# Patient Record
Sex: FEMALE | URBAN - METROPOLITAN AREA
[De-identification: names, ages, dates, MRNs, and addresses within clinical notes are randomized per-mention and may not be internally consistent; named-entity substitution may affect disease eponyms.]

---

## 2019-01-23 ENCOUNTER — APPOINTMENT (RX ONLY)
Dept: URBAN - METROPOLITAN AREA CLINIC 26 | Facility: CLINIC | Age: 59
Setting detail: DERMATOLOGY
End: 2019-01-23

## 2019-01-23 DIAGNOSIS — L40.0 PSORIASIS VULGARIS: ICD-10-CM

## 2019-01-23 PROBLEM — L85.3 XEROSIS CUTIS: Status: ACTIVE | Noted: 2019-01-23

## 2019-01-23 PROBLEM — L20.84 INTRINSIC (ALLERGIC) ECZEMA: Status: ACTIVE | Noted: 2019-01-23

## 2019-01-23 PROCEDURE — 99202 OFFICE O/P NEW SF 15 MIN: CPT

## 2019-01-23 PROCEDURE — ? TREATMENT REGIMEN

## 2019-01-23 PROCEDURE — ? COUNSELING

## 2019-01-23 PROCEDURE — ? PRESCRIPTION

## 2019-01-23 RX ORDER — PIMECROLIMUS 10 MG/G
CREAM TOPICAL
Qty: 1 | Refills: 3 | Status: ERX | COMMUNITY
Start: 2019-01-23

## 2019-01-23 RX ORDER — CLOBETASOL PROPIONATE 0.5 MG/G
1 CREAM TOPICAL
Qty: 1 | Refills: 3 | Status: ERX | COMMUNITY
Start: 2019-01-23

## 2019-01-23 RX ORDER — DESONIDE 0.5 MG/G
CREAM TOPICAL
Qty: 1 | Refills: 1 | Status: ERX | COMMUNITY
Start: 2019-01-23

## 2019-01-23 RX ORDER — TACROLIMUS 1 MG/G
OINTMENT TOPICAL
Qty: 1 | Refills: 3 | Status: ERX | COMMUNITY
Start: 2019-01-23

## 2019-01-23 RX ADMIN — DESONIDE 1: 0.5 CREAM TOPICAL at 00:00

## 2019-01-23 RX ADMIN — PIMECROLIMUS: 10 CREAM TOPICAL at 00:00

## 2019-01-23 RX ADMIN — CLOBETASOL PROPIONATE 1: 0.5 CREAM TOPICAL at 00:00

## 2019-01-23 RX ADMIN — TACROLIMUS: 1 OINTMENT TOPICAL at 00:00

## 2019-01-23 ASSESSMENT — LOCATION DETAILED DESCRIPTION DERM
LOCATION DETAILED: RIGHT ELBOW
LOCATION DETAILED: RIGHT MEDIAL SUPERIOR EYELID
LOCATION DETAILED: LEFT ELBOW
LOCATION DETAILED: LEFT LATERAL SUPERIOR EYELID

## 2019-01-23 ASSESSMENT — LOCATION SIMPLE DESCRIPTION DERM
LOCATION SIMPLE: RIGHT SUPERIOR EYELID
LOCATION SIMPLE: LEFT SUPERIOR EYELID
LOCATION SIMPLE: LEFT ELBOW
LOCATION SIMPLE: RIGHT ELBOW

## 2019-01-23 ASSESSMENT — LOCATION ZONE DERM
LOCATION ZONE: ARM
LOCATION ZONE: EYELID

## 2019-01-23 NOTE — PROCEDURE: TREATMENT REGIMEN
Action 4: Continue
Start Regimen: For face: use desonate cream bid x 5-7 days then switch to Elidel bid until clear OR Protopic 0.1% qd until clear (pt requests both prescriptions because she likes to alternate between cream and ointment depending on the time of day) \\n\\nFor elbows: clobetasol cream bid until clear (max of 4 weeks) then may use 2x per week prn for maintenance
Detail Level: Zone
Otc Regimen: Cerave psoriasis cream

## 2019-03-25 ENCOUNTER — APPOINTMENT (RX ONLY)
Dept: URBAN - METROPOLITAN AREA CLINIC 26 | Facility: CLINIC | Age: 59
Setting detail: DERMATOLOGY
End: 2019-03-25

## 2019-03-25 DIAGNOSIS — L81.4 OTHER MELANIN HYPERPIGMENTATION: ICD-10-CM

## 2019-03-25 DIAGNOSIS — L91.8 OTHER HYPERTROPHIC DISORDERS OF THE SKIN: ICD-10-CM

## 2019-03-25 DIAGNOSIS — L82.1 OTHER SEBORRHEIC KERATOSIS: ICD-10-CM

## 2019-03-25 DIAGNOSIS — L40.0 PSORIASIS VULGARIS: ICD-10-CM | Status: WELL CONTROLLED

## 2019-03-25 DIAGNOSIS — L82.0 INFLAMED SEBORRHEIC KERATOSIS: ICD-10-CM

## 2019-03-25 DIAGNOSIS — D22 MELANOCYTIC NEVI: ICD-10-CM

## 2019-03-25 DIAGNOSIS — D18.0 HEMANGIOMA: ICD-10-CM

## 2019-03-25 PROBLEM — D22.9 MELANOCYTIC NEVI, UNSPECIFIED: Status: ACTIVE | Noted: 2019-03-25

## 2019-03-25 PROBLEM — D18.01 HEMANGIOMA OF SKIN AND SUBCUTANEOUS TISSUE: Status: ACTIVE | Noted: 2019-03-25

## 2019-03-25 PROCEDURE — ? DEFER

## 2019-03-25 PROCEDURE — 17110 DESTRUCTION B9 LES UP TO 14: CPT

## 2019-03-25 PROCEDURE — 99214 OFFICE O/P EST MOD 30 MIN: CPT | Mod: 25

## 2019-03-25 PROCEDURE — ? COUNSELING

## 2019-03-25 PROCEDURE — ? LIQUID NITROGEN

## 2019-03-25 PROCEDURE — ? ADDITIONAL NOTES

## 2019-03-25 PROCEDURE — ? TREATMENT REGIMEN

## 2019-03-25 ASSESSMENT — LOCATION DETAILED DESCRIPTION DERM
LOCATION DETAILED: LEFT AXILLARY VAULT
LOCATION DETAILED: LEFT ELBOW
LOCATION DETAILED: LEFT ANTERIOR DISTAL THIGH
LOCATION DETAILED: LEFT LATERAL SUPERIOR EYELID
LOCATION DETAILED: LEFT RIB CAGE
LOCATION DETAILED: RIGHT MEDIAL SUPERIOR EYELID
LOCATION DETAILED: RIGHT ELBOW
LOCATION DETAILED: RIGHT DISTAL POSTERIOR UPPER ARM

## 2019-03-25 ASSESSMENT — LOCATION SIMPLE DESCRIPTION DERM
LOCATION SIMPLE: RIGHT ELBOW
LOCATION SIMPLE: ABDOMEN
LOCATION SIMPLE: LEFT AXILLARY VAULT
LOCATION SIMPLE: RIGHT SUPERIOR EYELID
LOCATION SIMPLE: RIGHT POSTERIOR UPPER ARM
LOCATION SIMPLE: LEFT THIGH
LOCATION SIMPLE: LEFT SUPERIOR EYELID
LOCATION SIMPLE: LEFT ELBOW

## 2019-03-25 ASSESSMENT — LOCATION ZONE DERM
LOCATION ZONE: EYELID
LOCATION ZONE: LEG
LOCATION ZONE: TRUNK
LOCATION ZONE: AXILLAE
LOCATION ZONE: ARM

## 2019-03-25 ASSESSMENT — PGA PSORIASIS: PGA PSORIASIS: CLEAR (NO ELEVATION, SCALE, OR ERYTHEMA, EXCEPT FOR RESIDUAL DISCOLORATION)

## 2019-03-25 NOTE — PROCEDURE: DEFER
Introduction Text (Please End With A Colon): The following procedure was deferred:
Detail Level: Detailed
Procedure To Be Performed At Next Visit: Liquid nitrogen

## 2019-03-25 NOTE — PROCEDURE: TREATMENT REGIMEN
Start Regimen: For face: use desonate cream bid x 5-7 days then switch to Elidel bid until clear OR Protopic 0.1% qd until clear (pt requests both prescriptions because she likes to alternate between cream and ointment depending on the time of day) \\n\\nFor elbows: clobetasol cream bid until clear (max of 4 weeks) then may use 2x per week prn for maintenance

## 2019-03-25 NOTE — PROCEDURE: TREATMENT REGIMEN
Plan: desonide 0.05 % topical cream  Sig: Apply thin layer to eyelids bid x 5-7days for major flares\\n clobetasol 0.05 % topical cream  Sig: apply to affected areas of elbows twice daily up to 4weeks then 2 x per week prn\\n Elidel 1 % topical cream  Sig: Apply thin layer of cream bid  to eyelids as needed when mild. Use this or tacrolimus but not both\\n tacrolimus 0.1 % topical ointment  Sig: Apply thin layer once daily to rash on eyelids when mild. Use this or elidel but not both at the same time Other Instructions: desonide 0.05 % topical cream  Sig: Apply thin layer to eyelids bid x 5-7days for major flares\\n clobetasol 0.05 % topical cream  Sig: apply to affected areas of elbows twice daily up to 4weeks then 2 x per week prn\\n Elidel 1 % topical cream  Sig: Apply thin layer of cream bid  to eyelids as needed when mild. Use this or tacrolimus but not both\\n tacrolimus 0.1 % topical ointment  Sig: Apply thin layer once daily to rash on eyelids when mild. Use this or elidel but not both at the same time

## 2019-03-25 NOTE — HPI: EVALUATION OF SKIN LESION(S)
What Type Of Note Output Would You Prefer (Optional)?: Bullet Format
Hpi Title: Evaluation of Skin Lesions
How Severe Are Your Spot(S)?: moderate
Have Your Spot(S) Been Treated In The Past?: has not been treated
Family Member: Sister

## 2019-03-25 NOTE — PROCEDURE: LIQUID NITROGEN
Medical Necessity Information: It is in your best interest to select a reason for this procedure from the list below. All of these items fulfill various CMS LCD requirements except the new and changing color options.
Detail Level: Detailed
Consent: The patient's consent was obtained including but not limited to risks of crusting, scabbing, blistering, scarring, darker or lighter pigmentary change, recurrence, incomplete removal and infection.
Add 52 Modifier (Optional): no
Number Of Freeze-Thaw Cycles: 2 freeze-thaw cycles
Medical Necessity Clause: This procedure was medically necessary because the lesions that were treated were:
Duration Of Freeze Thaw-Cycle (Seconds): 15
Post-Care Instructions: I reviewed with the patient in detail post-care instructions. Patient is to wear sunprotection, and avoid picking at any of the treated lesions. Pt may apply Vaseline to crusted or scabbing areas.

## 2020-02-18 ENCOUNTER — APPOINTMENT (RX ONLY)
Dept: URBAN - METROPOLITAN AREA CLINIC 26 | Facility: CLINIC | Age: 60
Setting detail: DERMATOLOGY
End: 2020-02-18

## 2020-02-18 DIAGNOSIS — L40.0 PSORIASIS VULGARIS: ICD-10-CM

## 2020-02-18 DIAGNOSIS — L82.0 INFLAMED SEBORRHEIC KERATOSIS: ICD-10-CM

## 2020-02-18 DIAGNOSIS — L91.8 OTHER HYPERTROPHIC DISORDERS OF THE SKIN: ICD-10-CM

## 2020-02-18 PROCEDURE — ? TREATMENT REGIMEN

## 2020-02-18 PROCEDURE — ? ADDITIONAL NOTES

## 2020-02-18 PROCEDURE — ? COUNSELING

## 2020-02-18 PROCEDURE — 99213 OFFICE O/P EST LOW 20 MIN: CPT | Mod: 25

## 2020-02-18 PROCEDURE — ? PRESCRIPTION

## 2020-02-18 PROCEDURE — 17111 DESTRUCTION B9 LESIONS 15/>: CPT

## 2020-02-18 PROCEDURE — ? LIQUID NITROGEN

## 2020-02-18 RX ORDER — TACROLIMUS 1 MG/G
OINTMENT TOPICAL
Qty: 1 | Refills: 3 | Status: ERX | COMMUNITY
Start: 2020-02-18

## 2020-02-18 RX ADMIN — TACROLIMUS: 1 OINTMENT TOPICAL at 00:00

## 2020-02-18 ASSESSMENT — LOCATION DETAILED DESCRIPTION DERM
LOCATION DETAILED: 2ND WEB SPACE LEFT HAND
LOCATION DETAILED: LEFT ELBOW
LOCATION DETAILED: LEFT PROXIMAL POSTERIOR THIGH
LOCATION DETAILED: LEFT CLAVICULAR NECK
LOCATION DETAILED: RIGHT MEDIAL SUPERIOR EYELID
LOCATION DETAILED: RIGHT ELBOW
LOCATION DETAILED: LEFT PROXIMAL PRETIBIAL REGION
LOCATION DETAILED: LEFT RADIAL DORSAL HAND
LOCATION DETAILED: RIGHT LATERAL DISTAL PRETIBIAL REGION
LOCATION DETAILED: RIGHT CLAVICULAR NECK
LOCATION DETAILED: RIGHT PROXIMAL PRETIBIAL REGION
LOCATION DETAILED: RIGHT RADIAL DORSAL HAND
LOCATION DETAILED: LEFT LATERAL SUPERIOR EYELID
LOCATION DETAILED: LEFT DORSAL INDEX FINGER METACARPOPHALANGEAL JOINT
LOCATION DETAILED: RIGHT DISTAL PRETIBIAL REGION
LOCATION DETAILED: RIGHT ULNAR DORSAL HAND
LOCATION DETAILED: RIGHT MEDIAL UPPER BACK

## 2020-02-18 ASSESSMENT — LOCATION ZONE DERM
LOCATION ZONE: TRUNK
LOCATION ZONE: NECK
LOCATION ZONE: ARM
LOCATION ZONE: LEG
LOCATION ZONE: HAND
LOCATION ZONE: EYELID

## 2020-02-18 ASSESSMENT — LOCATION SIMPLE DESCRIPTION DERM
LOCATION SIMPLE: LEFT POSTERIOR THIGH
LOCATION SIMPLE: LEFT PRETIBIAL REGION
LOCATION SIMPLE: RIGHT SUPERIOR EYELID
LOCATION SIMPLE: LEFT ANTERIOR NECK
LOCATION SIMPLE: LEFT ELBOW
LOCATION SIMPLE: RIGHT PRETIBIAL REGION
LOCATION SIMPLE: RIGHT ANTERIOR NECK
LOCATION SIMPLE: RIGHT ELBOW
LOCATION SIMPLE: RIGHT HAND
LOCATION SIMPLE: RIGHT UPPER BACK
LOCATION SIMPLE: LEFT SUPERIOR EYELID
LOCATION SIMPLE: LEFT HAND

## 2020-02-18 NOTE — PROCEDURE: LIQUID NITROGEN
Number Of Freeze-Thaw Cycles: 2 freeze-thaw cycles
Medical Necessity Clause: This procedure was medically necessary because the lesions that were treated were:
Render Note In Bullet Format When Appropriate: No
Post-Care Instructions: I reviewed with the patient in detail post-care instructions. Patient is to wear sunprotection, and avoid picking at any of the treated lesions. Pt may apply Vaseline to crusted or scabbing areas.
Duration Of Freeze Thaw-Cycle (Seconds): 15
Detail Level: Detailed
Consent: The patient's consent was obtained including but not limited to risks of crusting, scabbing, blistering, scarring, darker or lighter pigmentary change, recurrence, incomplete removal and infection.
Medical Necessity Information: It is in your best interest to select a reason for this procedure from the list below. All of these items fulfill various CMS LCD requirements except the new and changing color options.

## 2020-02-18 NOTE — PROCEDURE: TREATMENT REGIMEN
Continue Regimen: For face: use desonate cream bid x 5-7 days then switch to Elidel bid until clear OR Protopic 0.1% qd until clear (pt requests both prescriptions because she likes to alternate between cream and ointment depending on the time of day) \\n\\nFor elbows: clobetasol cream bid until clear (max of 4 weeks) then may use 2x per week prn for maintenance
Detail Level: Zone
Action 1: Continue
Otc Regimen: Bare40 with salicylic acid for elbows

## 2022-04-21 ENCOUNTER — APPOINTMENT (RX ONLY)
Dept: URBAN - METROPOLITAN AREA CLINIC 26 | Facility: CLINIC | Age: 62
Setting detail: DERMATOLOGY
End: 2022-04-21

## 2022-04-21 DIAGNOSIS — F42.4 EXCORIATION (SKIN-PICKING) DISORDER: ICD-10-CM

## 2022-04-21 DIAGNOSIS — L40.0 PSORIASIS VULGARIS: ICD-10-CM

## 2022-04-21 PROBLEM — S40.912A UNSPECIFIED SUPERFICIAL INJURY OF LEFT SHOULDER, INITIAL ENCOUNTER: Status: ACTIVE | Noted: 2022-04-21

## 2022-04-21 PROCEDURE — ? ADDITIONAL NOTES

## 2022-04-21 PROCEDURE — ? OTHER

## 2022-04-21 PROCEDURE — ? COUNSELING

## 2022-04-21 PROCEDURE — ? PHOTO-DOCUMENTATION

## 2022-04-21 PROCEDURE — ? PRESCRIPTION

## 2022-04-21 PROCEDURE — ? PRESCRIPTION MEDICATION MANAGEMENT

## 2022-04-21 PROCEDURE — 99213 OFFICE O/P EST LOW 20 MIN: CPT

## 2022-04-21 RX ORDER — MUPIROCIN 20 MG/G
OINTMENT TOPICAL
Qty: 22 | Refills: 1 | Status: ERX | COMMUNITY
Start: 2022-04-21

## 2022-04-21 RX ADMIN — MUPIROCIN 1: 20 OINTMENT TOPICAL at 00:00

## 2022-04-21 ASSESSMENT — LOCATION DETAILED DESCRIPTION DERM
LOCATION DETAILED: RIGHT MEDIAL SUPERIOR EYELID
LOCATION DETAILED: LEFT POSTERIOR SHOULDER
LOCATION DETAILED: RIGHT ELBOW
LOCATION DETAILED: LEFT ELBOW
LOCATION DETAILED: LEFT LATERAL SUPERIOR EYELID

## 2022-04-21 ASSESSMENT — LOCATION SIMPLE DESCRIPTION DERM
LOCATION SIMPLE: LEFT SHOULDER
LOCATION SIMPLE: RIGHT SUPERIOR EYELID
LOCATION SIMPLE: LEFT SUPERIOR EYELID
LOCATION SIMPLE: LEFT ELBOW
LOCATION SIMPLE: RIGHT ELBOW

## 2022-04-21 ASSESSMENT — LOCATION ZONE DERM
LOCATION ZONE: ARM
LOCATION ZONE: EYELID

## 2022-04-21 NOTE — PROCEDURE: PRESCRIPTION MEDICATION MANAGEMENT
Continue Regimen: For face: use desonate cream bid x 5-7 days then switch to Elidel bid until clear OR Protopic 0.1% qd until clear (pt requests both prescriptions because she likes to alternate between cream and ointment depending on the time of day) \\n\\nFor elbows: clobetasol cream bid until clear (max of 4 weeks) then may use 2x per week prn for maintenance
Render In Strict Bullet Format?: No
Detail Level: Simple
Initiate Treatment: mupirocin 2 % topical ointment: Apply to affected area 2-3 times daily x 10 days.

## 2022-04-21 NOTE — PROCEDURE: OTHER
Render Risk Assessment In Note?: no
Other (Free Text): Discussed that primary lesions are difficult to evaluate due to excoriation.
Detail Level: Simple
Note Text (......Xxx Chief Complaint.): This diagnosis correlates with the

## 2022-06-06 ENCOUNTER — APPOINTMENT (RX ONLY)
Dept: URBAN - METROPOLITAN AREA CLINIC 26 | Facility: CLINIC | Age: 62
Setting detail: DERMATOLOGY
End: 2022-06-06

## 2022-06-06 DIAGNOSIS — L57.8 OTHER SKIN CHANGES DUE TO CHRONIC EXPOSURE TO NONIONIZING RADIATION: ICD-10-CM

## 2022-06-06 DIAGNOSIS — L82.1 OTHER SEBORRHEIC KERATOSIS: ICD-10-CM

## 2022-06-06 DIAGNOSIS — D22 MELANOCYTIC NEVI: ICD-10-CM

## 2022-06-06 DIAGNOSIS — L91.8 OTHER HYPERTROPHIC DISORDERS OF THE SKIN: ICD-10-CM

## 2022-06-06 DIAGNOSIS — L81.0 POSTINFLAMMATORY HYPERPIGMENTATION: ICD-10-CM

## 2022-06-06 DIAGNOSIS — D18.0 HEMANGIOMA: ICD-10-CM

## 2022-06-06 PROBLEM — D18.01 HEMANGIOMA OF SKIN AND SUBCUTANEOUS TISSUE: Status: ACTIVE | Noted: 2022-06-06

## 2022-06-06 PROBLEM — D22.5 MELANOCYTIC NEVI OF TRUNK: Status: ACTIVE | Noted: 2022-06-06

## 2022-06-06 PROCEDURE — ? BENIGN DESTRUCTION

## 2022-06-06 PROCEDURE — ? PATIENT SPECIFIC COUNSELING

## 2022-06-06 PROCEDURE — ? PRESCRIPTION MEDICATION MANAGEMENT

## 2022-06-06 PROCEDURE — ? SUNSCREEN RECOMMENDATIONS

## 2022-06-06 PROCEDURE — ? COUNSELING

## 2022-06-06 PROCEDURE — 99213 OFFICE O/P EST LOW 20 MIN: CPT | Mod: 25

## 2022-06-06 PROCEDURE — ? ADDITIONAL NOTES

## 2022-06-06 PROCEDURE — ? FULL BODY SKIN EXAM

## 2022-06-06 PROCEDURE — ? LIQUID NITROGEN (COSMETIC)

## 2022-06-06 PROCEDURE — 17110 DESTRUCTION B9 LES UP TO 14: CPT

## 2022-06-06 ASSESSMENT — LOCATION SIMPLE DESCRIPTION DERM
LOCATION SIMPLE: LEFT ANKLE
LOCATION SIMPLE: RIGHT AXILLARY VAULT
LOCATION SIMPLE: RIGHT ANKLE
LOCATION SIMPLE: LEFT LOWER LEG
LOCATION SIMPLE: UPPER BACK
LOCATION SIMPLE: RIGHT FOOT
LOCATION SIMPLE: LEFT SHOULDER
LOCATION SIMPLE: LEFT FOOT
LOCATION SIMPLE: LEFT AXILLARY VAULT

## 2022-06-06 ASSESSMENT — LOCATION DETAILED DESCRIPTION DERM
LOCATION DETAILED: LEFT LATERAL ANKLE
LOCATION DETAILED: LEFT POSTERIOR SHOULDER
LOCATION DETAILED: RIGHT ANTERIOR LATERAL MALLEOLUS
LOCATION DETAILED: RIGHT AXILLARY VAULT
LOCATION DETAILED: RIGHT LATERAL ACHILLES SKIN
LOCATION DETAILED: LEFT LATERAL MALLEOLUS
LOCATION DETAILED: LEFT DORSAL FOOT
LOCATION DETAILED: LEFT AXILLARY VAULT
LOCATION DETAILED: RIGHT LATERAL MALLEOLUS
LOCATION DETAILED: RIGHT DORSAL FOOT
LOCATION DETAILED: LEFT LATERAL DORSAL FOOT
LOCATION DETAILED: LEFT MEDIAL DORSAL FOOT
LOCATION DETAILED: LEFT ANKLE
LOCATION DETAILED: INFERIOR THORACIC SPINE
LOCATION DETAILED: RIGHT LATERAL DORSAL FOOT

## 2022-06-06 ASSESSMENT — LOCATION ZONE DERM
LOCATION ZONE: AXILLAE
LOCATION ZONE: TRUNK
LOCATION ZONE: FEET
LOCATION ZONE: LEG
LOCATION ZONE: ARM

## 2022-06-06 NOTE — PROCEDURE: ADDITIONAL NOTES
Render Risk Assessment In Note?: yes
Detail Level: Simple
Additional Notes: Actinic damage is a chronic illness that requires treatment and long-term monitoring. Treatments may include photo protection with pants, long-sleeves, wide-brimmed hats and broad spectrum sunscreen with spf 30 or higher to exposed areas. Without treatment, actinic damage can lead to cutaneous malignancies which can result in disfigurement, pain, organ damage, and in some cases, death.
Additional Notes: Patient consent was obtained to proceed with the visit and recommended plan of care after discussion of all risks and benefits, including the risks of COVID-19 exposure.
Detail Level: Zone

## 2022-06-06 NOTE — PROCEDURE: BENIGN DESTRUCTION
Detail Level: Simple
Consent: The patient's consent was obtained including but not limited to risks of crusting, scabbing, blistering, scarring, darker or lighter pigmentary change, recurrence, incomplete removal and infection.
Total Number Of Lesions Treated: 8
Render Note In Bullet Format When Appropriate: No
Include Z78.9 (Other Specified Conditions Influencing Health Status) As An Associated Diagnosis?: Yes
Medical Necessity Clause: This procedure was medically necessary because the lesions that were treated were:
Medical Necessity Information: It is in your best interest to select a reason for this procedure from the list below. All of these items fulfill various CMS LCD requirements except the new and changing color options.
Anesthesia Type: 1% lidocaine with epinephrine 1:100,000 buffered with 8.4% sodium bicarbonate (1:9 ratio)
Anesthesia Volume In Cc: 0.5
Post-Care Instructions: I reviewed with the patient in detail post-care instructions. Patient is to wear sunprotection, and avoid picking at any of the treated lesions. Pt may apply Vaseline to crusted or scabbing areas.

## 2022-06-06 NOTE — PROCEDURE: PRESCRIPTION MEDICATION MANAGEMENT
Discontinue Regimen: mupirocin ointment
Detail Level: Simple
Plan: Explained to patient no active rash noted on exam today, the spots she points to are where there were excoriations (compared area today with photo from last visit), these are PIH/scars from where she had scratched. \\nPatient understands that should the rash recur, rto re-evaluation.
Render In Strict Bullet Format?: No

## 2022-06-06 NOTE — PROCEDURE: LIQUID NITROGEN (COSMETIC)
Spray Paint Text: The liquid nitrogen was applied to the skin utilizing a spray paint frosting technique.
Render Post-Care Instructions In Note?: yes
Consent: The patient's consent was obtained including but not limited to risks of crusting, scabbing, blistering, scarring, darker or lighter pigmentary change, recurrence, incomplete removal and infection. The patient understands that the procedure is cosmetic in nature and is not covered by insurance.
Price (Use Numbers Only, No Special Characters Or $): 200
Spray Paint Technique: No
Billing Information: Bill by Static Price
Post-Care Instructions: I reviewed with the patient in detail post-care instructions. Patient is to wear sunprotection, and avoid picking at any of the treated lesions. Pt may apply Vaseline to crusted or scabbing areas.
Detail Level: Simple